# Patient Record
(demographics unavailable — no encounter records)

---

## 2024-10-07 NOTE — HISTORY OF PRESENT ILLNESS
[de-identified] : 80 y/o F here for a f/u.   She reports having episodes of dizziness for several months, describing the sensation as a spinning sensation and feeling like she is going to fall. She reports a heavy feeling in her head, with episodes lasting seconds to minutes, occurring 3-4 times a day, brought on by specific rapid head movements. There is no associated ringing in the ears, nausea, or vomiting. She feels the need to sit down during episodes, which resolve on their own. This has been happening daily. Her blood pressure is recorded at 151/71 and 155/75 at times during these episodes, and she reports previously being on 20 mg of propanlol at bedtime, which she reduced to half a few months ago. She currently takes propranolol but it does not appear to be correlated to symptoms of dizziness.   The patient also requests a refill for her statin.   Over the past few years, she has noticed memory loss, specifically forgetting names of distant relatives, requiring her to write them down. However, she recalls daily activities such as turning off the stove and locking doors are not an issue. There is no family history of Alzheimer's or Parkinson's disease. During cognitive testing, she is alert and oriented to person, place, and time, though she is unable to name the president. She correctly identifies Katy Roy as the Democratic candidate for the upcoming election but could not recall Abad Zhu as the [employer] party candidate and could not recall Jake Merida as the current president. When asked to count backward from 100, she only reaches 93, and she recalls 1/3 words (banana, chair, sunset) initially, and 3/3 with prompting. She is able to draw a clock with the numbers in the correct position but incorrect hands for 5:10. She has never had a brain MRI before.   Patient accompanied by her daughter who is a pharmacist, lives in [place], but comes back home to help with her parents.

## 2024-10-07 NOTE — PHYSICAL EXAM
[Normal Sclera/Conjunctiva] : normal sclera/conjunctiva [No Lymphadenopathy] : no lymphadenopathy [Supple] : supple [Thyroid Normal, No Nodules] : the thyroid was normal and there were no nodules present [No Focal Deficits] : no focal deficits [Normal Gait] : normal gait [Alert and Oriented x3] : oriented to person, place, and time [Normal] : affect was normal and insight and judgment were intact [de-identified] : impacted cerumen of left ear  [de-identified] : CN II-XII intact, nystagmus on left lateral gaze. No baseline tremors appreciated.

## 2024-10-07 NOTE — PLAN
[FreeTextEntry1] : #Vertigo Most likely BPPV/peripheral veritgo. No brainstem signs or concern for central etiology of stroke. Went over epley maneuver with patient and showed a Youtube video that daughter will be able to show to her mom.   #Memory Loss Patient does have signs of mild cognitive impairment. Mini-cog is abnormal. Discussing next step for specialist referral, patient's daughter would prefer geriatrics for a formal cognitive assessment as well as medication reconciliation. No hx of stroke.   #HLD  Risk-benefit discussed had with patient and daughter. Will c/w statin.   #HTN Renew propanol   #Impacted cerumen of left ear -OTC debrox

## 2025-06-04 NOTE — REVIEW OF SYSTEMS
[Anxiety] : anxiety [Fever] : no fever [Chills] : no chills [Loss Of Hearing] : no hearing loss [Heart Rate Is Slow] : the heart rate was not slow [Heart Rate Is Fast] : the heart rate was not fast [Chest Pain] : no chest pain [Lower Ext Edema] : no lower extremity edema [Shortness Of Breath] : no shortness of breath [Wheezing] : no wheezing [Cough] : no cough [Abdominal Pain] : no abdominal pain [Vomiting] : no vomiting [Dysuria] : no dysuria [Confused] : no confusion [Dizziness] : no dizziness [Depression] : no depression

## 2025-06-04 NOTE — HISTORY OF PRESENT ILLNESS
[No falls in past year] : Patient reported no falls in the past year [Several Days (1)] : 7.) Trouble concentrating on things, such as reading a newspaper or watching television? Several days [Not at All (0)] : 9.) Thoughts that you would be off dead or of hurting yourself in some way? Not at all [Completely Independent] : Completely independent. [Independent] : housekeeping [] : Assistance needed managing finances. [Little interest or pleasure doing things] : 1) Little interest or pleasure doing things [0] : 1) Little interest or pleasure doing things: Not at all (0) [Feeling down, depressed, or hopeless] : 2) Feeling down, depressed, or hopeless [1] : 2) Feeling down, depressed, or hopeless for several days (1) [PHQ-2 Negative - No further assessment needed] : PHQ-2 Negative - No further assessment needed [FreeTextEntry1] : 81 yo female w/ a hx of htn, anxiety, hld, hypothyroidism and memory loss presents to the office to establish care. Pt follows up with Dr. Reyes as her PCP. She has concerns for memory loss for which she would like to establish care with Geriatrics. Patient is accompanied by her daughter who helps provide some history. Pt reports increasing word finding difficulty and she is beginning to forget Names and faces of people from the past. She recognizes all her family members etc. Pt stopped managing her own finances as she was double paying her bills due to feeling anxious regarding missing a payment. Family noticed memory concerns about 1 year ago.   She lives with her  who has dementia. She is his primary caregiver. She is independent of all ADLs but dependent on driving and finances as far as iADLs are concerned.    Hypothyroidism Taking levothyroxine 50mcg daily.  Htn Taking Propranolol 10mg BID  HLD Taking simvastatin 40mg daily.  Retired  Lives with , condo Caregiver for . Goes to senior center for a few days a week. [FreeTextEntry6] : stopped driving because of anxiety, stopped driving 15 years ago [de-identified] : double paying  [[id number]] : 1 [[id number]] : 3

## 2025-06-04 NOTE — PHYSICAL EXAM
[Alert] : alert [No Acute Distress] : in no acute distress [EOMI] : extraocular movements were intact [Normal Appearance] : the appearance of the neck was normal [Supple] : the neck was supple [No Respiratory Distress] : no respiratory distress [No Acc Muscle Use] : no accessory muscle use [Oriented To Time, Place, And Person] : oriented to person, place, and time [Normal Affect] : the affect was normal [Normal Insight/Judgment] : insight and judgment were intact [Normal Mood] : the mood was normal [___ / 5] : Visuospatial / Executive: [unfilled] / 5 [___ / 3] : Attention (Serial 7 subtraction): [unfilled] / 3 [___ / 1] : Fluency: [unfilled] / 1 [___ / 2] : Abstraction: [unfilled] / 2 [___ / 5] : Delayed Recall: [unfilled] / 5 [___ / 6] : Orientation: [unfilled] / 6 [MocaTotal] : 16 [FreeTextEntry1] : 06/04/25

## 2025-07-14 NOTE — HISTORY OF PRESENT ILLNESS
[FreeTextEntry1] : general follow up  [de-identified] : pt tested [facility]   declining now on hospice was dizzy and fell in kitchen no injuries and no further episodes had vertigo a year ago